# Patient Record
Sex: MALE | Race: WHITE
[De-identification: names, ages, dates, MRNs, and addresses within clinical notes are randomized per-mention and may not be internally consistent; named-entity substitution may affect disease eponyms.]

---
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
## 2022-05-24 ENCOUNTER — HOSPITAL ENCOUNTER (EMERGENCY)
Dept: HOSPITAL 56 - MW.ED | Age: 22
Discharge: HOME | End: 2022-05-24
Payer: COMMERCIAL

## 2022-05-24 DIAGNOSIS — Z23: ICD-10-CM

## 2022-05-24 DIAGNOSIS — S40.811A: ICD-10-CM
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
## 2022-05-24 DIAGNOSIS — S40.812A: Primary | ICD-10-CM

## 2022-05-24 DIAGNOSIS — S80.812A: ICD-10-CM

## 2022-05-24 DIAGNOSIS — S80.811A: ICD-10-CM
